# Patient Record
Sex: FEMALE | Race: ASIAN | NOT HISPANIC OR LATINO | URBAN - METROPOLITAN AREA
[De-identification: names, ages, dates, MRNs, and addresses within clinical notes are randomized per-mention and may not be internally consistent; named-entity substitution may affect disease eponyms.]

---

## 2023-06-30 ENCOUNTER — EMERGENCY (EMERGENCY)
Facility: HOSPITAL | Age: 38
LOS: 1 days | Discharge: HOME | End: 2023-06-30
Admitting: EMERGENCY MEDICINE
Payer: COMMERCIAL

## 2023-06-30 VITALS — HEART RATE: 85 BPM | DIASTOLIC BLOOD PRESSURE: 57 MMHG | SYSTOLIC BLOOD PRESSURE: 108 MMHG

## 2023-06-30 VITALS
HEIGHT: 66 IN | SYSTOLIC BLOOD PRESSURE: 104 MMHG | WEIGHT: 115.08 LBS | HEART RATE: 78 BPM | RESPIRATION RATE: 18 BRPM | TEMPERATURE: 99 F | DIASTOLIC BLOOD PRESSURE: 70 MMHG | OXYGEN SATURATION: 93 %

## 2023-06-30 PROCEDURE — 99284 EMERGENCY DEPT VISIT MOD MDM: CPT

## 2023-06-30 PROCEDURE — 72100 X-RAY EXAM L-S SPINE 2/3 VWS: CPT | Mod: 26

## 2023-06-30 RX ORDER — LIDOCAINE 4 G/100G
1 CREAM TOPICAL ONCE
Refills: 0 | Status: COMPLETED | OUTPATIENT
Start: 2023-06-30 | End: 2023-06-30

## 2023-06-30 RX ORDER — IBUPROFEN 200 MG
1 TABLET ORAL
Qty: 28 | Refills: 0
Start: 2023-06-30 | End: 2023-07-06

## 2023-06-30 RX ORDER — CYCLOBENZAPRINE HYDROCHLORIDE 10 MG/1
1 TABLET, FILM COATED ORAL
Qty: 21 | Refills: 0
Start: 2023-06-30 | End: 2023-07-06

## 2023-06-30 RX ORDER — LIDOCAINE 4 G/100G
1 CREAM TOPICAL
Qty: 14 | Refills: 0
Start: 2023-06-30 | End: 2023-07-06

## 2023-06-30 RX ADMIN — LIDOCAINE 1 PATCH: 4 CREAM TOPICAL at 16:26

## 2023-06-30 NOTE — ED ADULT NURSE NOTE - NSFALLUNIVINTERV_ED_ALL_ED
Bed/Stretcher in lowest position, wheels locked, appropriate side rails in place/Call bell, personal items and telephone in reach/Instruct patient to call for assistance before getting out of bed/chair/stretcher/Non-slip footwear applied when patient is off stretcher/[place] to call system/Physically safe environment - no spills, clutter or unnecessary equipment/Purposeful proactive rounding/Room/bathroom lighting operational, light cord in reach

## 2023-06-30 NOTE — ED ADULT TRIAGE NOTE - CHIEF COMPLAINT QUOTE
Pt walks in c/o lower back pain radiating to R leg. Pt states pain began 3 wks ago while lifting her child but worsened today. Pt denies any urinary symptoms.

## 2023-06-30 NOTE — ED PROVIDER NOTE - PROGRESS NOTE DETAILS
Sciatica vs. scoliosis vs. muscle spasm  -Urine hcg  -LS Spine Xray  -Perocet/lidoderm patch  -Observe and reassess -Urine hcg  -LS Spine Xray  -Perocet/lidoderm patch -Urine hcg is negative  -Official result discussed with the patient.   -LS spine xray shows: Grade 1 anterolisthesis of L5 on S1 with questionable L5 pars   defect. Degenerative disc disease L5/S1.  -Post percocet/lidoderm patch relief her pain and she is feeling much better, able to walk and stand, steady gait.  I recommend orthopedic spine for follow up and avoid gym/exercise/activity.  Pt. verbally expressed understanding.  -Discharge home with motrin/flexeril/lidoderm patch, rest, heat compression, see pcp and orthopedic in  3 days, ER for any new or worsening signs or symptoms. -Urine hcg is negative  -Official result discussed with the patient.   -LS spine xray shows: Grade 1 anterolisthesis of L5 on S1 with questionable L5 pars   defect. Degenerative disc disease L5/S1. Report copy given to the patient and this would explained her chronic pain.  Pt. appreciate and she has no other questions or concerns for me.   -Post percocet/lidoderm patch relief her pain and she is feeling much better, able to walk and stand, steady gait.  I recommend orthopedic spine for follow up and avoid gym/exercise/activity.  Pt. verbally expressed understanding.  -Discharge home with motrin/flexeril/lidoderm patch, rest, heat compression, see pcp and orthopedic in  3 days, ER for any new or worsening signs or symptoms.

## 2023-06-30 NOTE — ED PROVIDER NOTE - CLINICAL SUMMARY MEDICAL DECISION MAKING FREE TEXT BOX
low back pain with possible pars defect along with DJD and anterolisthesis, xray performed, pain well controlled, walking around now and significantly better.  I recommend the patient to have outpatient advanced imaging and orthopedic spine with stopping all strenuous activity for now.

## 2023-06-30 NOTE — ED PROVIDER NOTE - MUSCULOSKELETAL, MLM
LS spine: +ttp on the rt. paraspinal muscle region, no midline tenderness or step off, no cva tenderness, FROM without limitation but pain with movement, sensation intact, motor 5/5, SLR test unable to perform due to the pain, lower extremities sensation intact with motor 5/5 with +DPPT pulses intact bilaterally.

## 2023-06-30 NOTE — ED PROVIDER NOTE - OBJECTIVE STATEMENT
38 y/o female, pmh including chronic lower back pain for the past few months, nkda, c/o lower back pain x few weeks. Pt. stated that she has history of rt. lower back pain, started again about several weeks ago, flared up again today, aching pain, aggravated by movement, radiating to the rt. distal thigh region,  took motrin prior to arrival with slight improvement, no fall or trauma.  Pt. has no urinary or bowel incontinence/retention, no saddle paresthesia, no chest pain/sob/palpitation/numbness/tingling/weakness, no fever or chills, no urinary complaints. 36 y/o female, pmh including chronic lower back pain for the past several months (never evaluate by anyone with no imaging done), nkda, c/o lower back pain x few weeks. Pt. stated that she has history of rt. lower back pain, started again about several weeks ago, flared up again today, aching pain, aggravated by movement, radiating to the rt. distal thigh region,  took motrin prior to arrival with slight improvement, no fall or trauma.  Pt. has no urinary or bowel incontinence/retention, no saddle paresthesia, no chest pain/sob/palpitation/numbness/tingling/weakness, no fever or chills, no urinary complaints.

## 2023-06-30 NOTE — ED PROVIDER NOTE - CARE PROVIDER_API CALL
Juanito Pichardo  Orthopaedic Surgery  [address] [place] [address], [address]  [place] [address]  Phone: [phone]  Fax: [phone]  Follow Up Time: Urgent

## 2023-06-30 NOTE — ED PROVIDER NOTE - PATIENT PORTAL LINK FT
You can access the FollowMyHealth Patient Portal offered by [facility] by registering at the following website: [email or web address]. By joining [employer]’s FollowMyHealth portal, you will also be able to view your health information using other applications (apps) compatible with our system.

## 2023-07-03 NOTE — ED POST DISCHARGE NOTE - RESULT SUMMARY
patient's dc diagnosis is consistent with peervue report and received spine center follow up. no call needed.

## 2023-07-04 DIAGNOSIS — M47.9 SPONDYLOSIS, UNSPECIFIED: ICD-10-CM

## 2023-07-04 DIAGNOSIS — G89.29 OTHER CHRONIC PAIN: ICD-10-CM

## 2023-07-04 DIAGNOSIS — M54.50 LOW BACK PAIN, UNSPECIFIED: ICD-10-CM

## 2023-07-04 DIAGNOSIS — M43.16 SPONDYLOLISTHESIS, LUMBAR REGION: ICD-10-CM
